# Patient Record
(demographics unavailable — no encounter records)

---

## 2025-01-08 NOTE — HISTORY OF PRESENT ILLNESS
[Public] : Public [ICT: _____] : Integrated Co-teaching class (Collaborative Team Teaching) [unfilled] [IEP] : Individualized Education Program [OHI] : Other Health Impairment [OT: ____] : Occupational Therapy [unfilled] [PT:____] : Physical Therapy [unfilled] [Aide: _____] : Aide or Paraprofessional [unfilled] [Counseling: _____] : Counseling [unfilled] [TA: Time: _____] : Extra time for tests [unfilled] [Spec. Transportation] : Special Transportation: Yes [Other: ____] : [unfilled] [Entering in September] : entering in September [Doing Well] : Doing well [TA: Other] : Other testing accommodations [No Side Effects] : no side effects [12 mos.] : 12 - Month Special Service and/or Program: No [FreeTextEntry4] : attends [employer] #13 [FreeTextEntry3] : dated 10/17/2024 (see scanned docs). Annual review scheduled for 10/10/2025. [FreeTextEntry2] : At time of IEP evaluation (beginning of 4th gr), DERRICK is functioning on grade level (3rd) for both Reading & Math.  [FreeTextEntry1] : 2024-25 School Year-- .CALVIN attends a full five day in-person learning schedule   [TWNoteComboBox1] : 4th Grade [de-identified] : During [employer] Classroom Observation (3rd period), DERRICK displayed difficulty with organization, remaining on task and frequently distracted. He required consistent support from the  to refocus and prompt and complete tasks.  [de-identified] : [employer] Psychoeducational Evaluation done in Sept 2024 with FSIQ = Uppermost End of Low Average; Visual Spatial & Working Memory = Average, Processing Speed & Fluid Reasoning = Borderline range.  [de-identified] : Methylphenidate HCL ER (LA) 40mg is effective  [de-identified] : Teachers report. CALVIN being a little more fidgety. Request to increase the Methylphenidate HCL 10mg booster dose at 12pm. Behavior-sanchez .CALVIN is an "donnell" compared to at home.   [de-identified] : Upon review of DERRICK's [employer] Psychoeducational Assessment dated 9/30/2024, he presents with Social-Emotional Challenges as a significant source of difficulty and distress for him.  He is openly sad about not having many friends wishing to be more "popular" among his peers. He continues to have 1:1 and Grp Counseling per his IEP which is beneficial per Grandmother & .CALVIN.  [de-identified] : .CALVIN gives Grandmother some difficulties with writing assignments otherwise  usually can complete with a little support and the Methylphenidate HCL 10mg booster PRN.   [de-identified] : Very difficult; one moment. CALVIN is very sweet and the next very disrespectful and irritable.  [de-identified] : No concerns per Grandmother however school IEP states .CALVIN feeing sad and wishing to have more friends and be "more popular".  [de-identified] : .CALVIN can suddenly become irritable without any trigger.   [de-identified] : .CALVIN loves LEGO and books.  [de-identified] : Grandmother states both ofAugustine SIMPSON's biological parents have a history of Bipolar Disease.  [de-identified] : .CALVIN's mood changes spontaneously where he is loving & sweet and the next moment says very hurtful things to Grandmother. Discussed a trial of Alpha-2 Agonists to help with his mood and impulsivity. Clonidine trial prior was ineffective for sleep. Will try Guanfacine. Grandmother agreed to trial.   [FreeTextEntry6] : Denies any recent illness, accidents, ER visits or hospitalizations since last visit.

## 2025-01-08 NOTE — REASON FOR VISIT
[Follow-Up Visit] : a follow-up visit for [ADHD] : ADHD [Anxiety] : anxiety [Autism Spectrum Disorder] : autism spectrum disorder [Behavior Problems] : behavior problems [Response to Medication] : response to medication [Progress with Services] : progress with services [Other: ____] : [unfilled] [Patient] : patient [Developmental testing] : developmental testing [IEP] : IEP [Foster Parents/Guardian] : /guardian [FreeTextEntry4] : July 2024-- Hydroxyzine (10mg) 1/2-tablet "Magic Pill" PRN for anxiety is effective. For sleep, the 1 tablet is not needed.  Methylphenidate HCL ER (LA or CD) 40mg.  Methylphenidate HCL 10mg BID PRN Aug 2023-- Clonidine (0.1mg) 1/2-tablet HS--- D/c'ed June 2024. Melatonin PRN given. [FreeTextEntry3] : July 16, 2024

## 2025-01-08 NOTE — PHYSICAL EXAM
X Size Of Lesion In Cm: 0.4 [External ears normal] : external ears normal [Normal] : patient has a normal gait [Attention Intact] : attention intact [Able to redirect] : able to redirect [Well-behaved during visit] : well-behaved during visit [Cooperative when examined] : cooperative when examined [Appropriate eye contact] : appropriate eye contact [Smiles responsively] : smiles responsively [Quiet/calm] : quiet/calm [Positive mood] : positive mood [Answered questions appropriately] : answered questions appropriately [Responds to name] : responds to name [Able to follow one step commands] : able to follow one step commands [Joint attention noted] : joint attention noted [Social referencing noted] : social referencing noted [Toe-Walking] : no toe-walking [Easily Distracted] : not easily distracted [Needs frequent redirecting] : does not need frequent redirecting [Difficulty shifting attention or transitioning] : no difficulty shifting attention or transitioning [Fidgets] : does not fidget [Moves quickly from one activity to another] : does not move quickly from one activity to another [Oppositional] : not oppositional [Withholding] : no withholding [Negative mood] : no negative mood [Hypersensitive] : not hypersensitive [Echolalia] : no echolalia [Difficult to engage in play] : not difficult to engage in play [de-identified] : -- .CALVIN presented to the visit in a pleasant and polite manner. He was engaged in the conversation and able to expand on any questioned asked with full detail and recall in complete sentences.   -- when discussing his irritable behaviors, .CALVIN verbalizes understanding that he should behave that way (tearful but holding it back).  He states sometimes he can't control something he says or sometimes is just in a "bad" mood.

## 2025-01-08 NOTE — PLAN
[Findings (To Date)] : Findings from evaluation (to date) [Clinical Basis] : Clinical basis for current diagnosis and clinical impressions [Co-Morbidities] : Clinical disorders and problem commonly associated with this child's condition (now or in the future) [Prognosis] : Prognosis [Goals / Benefits] : Goals & potential benefits of treatment with medication, as well as the limitations of pharmacotherapy [Stimulants] : Potential benefits and limitations of treatment with stimulant medication.  Potential adverse events were also reviewed, including insomnia, reduced appetite, change in blood pressure or heart rate, headache, stomachache, slowing of growth, moodiness, and onset of tics [Alpha-2s] : Potential benefits and limitations of treatment with alpha-2 agonists. Potential adverse events were also reviewed, including dry mouth, constipation, sedation, and change in blood pressure with potential for light-headedness when standing.  [Compliance] : Importance of medication compliance [AE Strategies] : Strategies to reduce side effects from current or proposed medication regimen [Counseling] : Benefits and limits of counseling or therapy [Behavior Modification] : Behavior modification strategies [Resources] : Other available resources [CSE / IEP] : Committee on Special Education (CSE) evaluations and Individualized Education Programs (IEP) [Family Questions] : Family's questions were addressed [Diet] : Evidence-based clinical information about diet [Sleep] : The importance of sleep and strategies to ensure adequate sleep [Media / Screen Time] : Importance of limiting electronics, media, and screen time [Exercise] : Regular exercise [Reading] : Importance of daily reading [Injury Prevention] : injury prevention [Rationale for Medication Discussed] : The rationale for treating inattention, distractibility, hyperactivity, or impulsivity with medication was discussed. The desired effects, possible side effects, and need for monitoring response were reviewed. Information about various medication options was provided.  The option of not treating with medication was also discussed. [Cardiac risk factors for treatment] : Cardiac risk factors for treatment of stimulant medications were reviewed, including history of prior seizure, unexplained loss of consciousness, congenital heart disease, arrhythmias, or family history of sudden unexplained cardiac death in family members below the age of 40. [Med Options Discussed: _____] : - Medication options discussed [unfilled] [Continue IEP] : - Continue services as presently provided for in the Individualized Education Program [More Classroom Support] : - In the classroom, [unfilled] will need more support, guidance, positive reinforcement and feedback than many of ~his/her~ classmates.  Accordingly, ~he/she~ will need to be in a classroom with a low student to teacher ratio.  Placement in an inclusion/collaborative teaching classroom would achieve this goal [ADHD EDU/Behav. Strategies (Gen)] : - Those educational and behavioral strategies known to be helpful to children with ADHD should be implemented in the classroom. [Instruction in Executive Function Skills] : - Direct, individualized instruction in executive function-related skills: i.e. task analysis, planning, organization, study strategies, memorization [Individual In-School Counseling] : - Individual counseling weekly with school psychologist or  [Social Skills] : - Social skills training [Behavior Management Training (parents)] : - Behavior management training / counseling for parents [Home Behavior Techniques] : - Specific behavioral techniques that can be implemented at home were discussed [Cessation of screen use before bedtime] : - Ensure cessation of video screen use one hour before bedtime [Limit Screen Time] : - Limit screen time [Rationale Discussed] : - The rationale for treating inattention, distractibility, hyperactivity, or impulsivity with medication was discussed. The desired effects, possible side effects, and need for monitoring response were reviewed. The various available medications were compared and contrasted, and the option of not treating with medication were also discussed [Cardiac risk factors for treatment] : - Cardiac risk factors for treatment of stimulant medications were reviewed, including history of prior seizure, unexplained loss of consciousness, congenital heart disease, arrhythmias, or family history of sudden unexplained cardiac death in family members below the age of 40 [Developmental Testiing] : Clinical implications of developmental testing [Other: _____] : [unfilled] [Dev. Therapies: ____] : Benefits and limits of developmental therapies: [unfilled] [FreeTextEntry1] :   ADHD-- continue with Methylphenidate HCL ER (LA) to 40mg and increase booster of Methylphenidate HCL to 20mg BID PRN Updated 504 done for school nurse.  Discussed an adjunct of Alpha-2 Agonists (Guanfacine) if his behaviors return in the Fall. Medication educational handout given for reference. Grandmother will reach out when she feels a trial is needed.  Updated Psychoeducational and 2024-25 IEP reviewed. Continue with therapies particularly 1:1 Counseling regarding .CALVIN's feelings of social difficulties with peers. Encouraged his preferred activities of LEGO and books as well as non-preferred activities to help with his peer relationships.  Follow-up on Adoption status.   ASD/ODB-- continue with consistent boundaries and limit setting.  Trial of Guanfacine (1mg) 1/2-tablet at HS x 7 days. On day #8, change administration to afternoon. Grandmother to call with an update of adjustment as needed. If Alpha-2 Agonists trial ineffective, discuss the possibility of an antipsychotic.   Recommended to reach out for [facility] as [facility] may now be covering the therapies when needed. .CALVIN would benefit from therapy to help with his dysregulation and Social Skills programs for his feelings of difficulties with peer relations, transitioning to do non-preferred activities to help his desire for increased friendships.   Sleep Difficulties/Bed Wetting-- Resolved. Continue with regular bedtime routine. .CALVIN now changes his own sheets and washes them when he refuses to get up at night to use the restroom. Will monitor.   Anxiety-- continue with school 1:1 counseling as per IEP. May add private as needed. Continue with Hydroxyzine (10mg) 1/2-tablet PRN which Grandmother states the "Magic Pill" is effective and .CALVIN's demeanor is better once taken. Will monitor. [facility] Parents & Child forms given. Will review at next visit.  Topics discussed with parent, refer to counseling section of note.  Grandmother is aware to call the office as needed should any concerns or questions arise otherwise return in 4-6 weeks.

## 2025-01-08 NOTE — HISTORY OF PRESENT ILLNESS
[Public] : Public [ICT: _____] : Integrated Co-teaching class (Collaborative Team Teaching) [unfilled] [IEP] : Individualized Education Program [OHI] : Other Health Impairment [OT: ____] : Occupational Therapy [unfilled] [PT:____] : Physical Therapy [unfilled] [Aide: _____] : Aide or Paraprofessional [unfilled] [Counseling: _____] : Counseling [unfilled] [TA: Time: _____] : Extra time for tests [unfilled] [Spec. Transportation] : Special Transportation: Yes [Other: ____] : [unfilled] [Entering in September] : entering in September [Doing Well] : Doing well [TA: Other] : Other testing accommodations [No Side Effects] : no side effects [12 mos.] : 12 - Month Special Service and/or Program: No [FreeTextEntry4] : attends [employer] #13 [FreeTextEntry3] : dated 10/17/2024 (see scanned docs). Annual review scheduled for 10/10/2025. [FreeTextEntry2] : At time of IEP evaluation (beginning of 4th gr), DERRICK is functioning on grade level (3rd) for both Reading & Math.  [FreeTextEntry1] : 2024-25 School Year-- .CALVIN attends a full five day in-person learning schedule   [TWNoteComboBox1] : 4th Grade [de-identified] : During [employer] Classroom Observation (3rd period), DERRICK displayed difficulty with organization, remaining on task and frequently distracted. He required consistent support from the  to refocus and prompt and complete tasks.  [de-identified] : [employer] Psychoeducational Evaluation done in Sept 2024 with FSIQ = Uppermost End of Low Average; Visual Spatial & Working Memory = Average, Processing Speed & Fluid Reasoning = Borderline range.  [de-identified] : Methylphenidate HCL ER (LA) 40mg is effective  [de-identified] : Teachers report. CALVIN being a little more fidgety. Request to increase the Methylphenidate HCL 10mg booster dose at 12pm. Behavior-sanchez .CALVIN is an "donnell" compared to at home.   [de-identified] : Upon review of DERRICK's [employer] Psychoeducational Assessment dated 9/30/2024, he presents with Social-Emotional Challenges as a significant source of difficulty and distress for him.  He is openly sad about not having many friends wishing to be more "popular" among his peers. He continues to have 1:1 and Grp Counseling per his IEP which is beneficial per Grandmother & .CALVIN.  [de-identified] : .CALVIN gives Grandmother some difficulties with writing assignments otherwise  usually can complete with a little support and the Methylphenidate HCL 10mg booster PRN.   [de-identified] : Very difficult; one moment. CALVIN is very sweet and the next very disrespectful and irritable.  [de-identified] : No concerns per Grandmother however school IEP states .CALVIN feeing sad and wishing to have more friends and be "more popular".  [de-identified] : .CALVIN can suddenly become irritable without any trigger.   [de-identified] : .CALVIN loves LEGO and books.  [de-identified] : Grandmother states both ofAugustine SIMPSON's biological parents have a history of Bipolar Disease.  [de-identified] : .CALVIN's mood changes spontaneously where he is loving & sweet and the next moment says very hurtful things to Grandmother. Discussed a trial of Alpha-2 Agonists to help with his mood and impulsivity. Clonidine trial prior was ineffective for sleep. Will try Guanfacine. Grandmother agreed to trial.   [FreeTextEntry6] : Denies any recent illness, accidents, ER visits or hospitalizations since last visit.

## 2025-01-08 NOTE — REVIEW OF SYSTEMS
[Moodiness] : moodiness [Irritability] : irritability [Normal] : Hematologic/Lymphatic [Difficulty Falling Asleep] : no difficulty falling asleep [Difficulty Remaining Asleep] : no difficulty remaining asleep

## 2025-01-08 NOTE — HISTORY OF PRESENT ILLNESS
[Public] : Public [ICT: _____] : Integrated Co-teaching class (Collaborative Team Teaching) [unfilled] [IEP] : Individualized Education Program [OHI] : Other Health Impairment [OT: ____] : Occupational Therapy [unfilled] [PT:____] : Physical Therapy [unfilled] [Aide: _____] : Aide or Paraprofessional [unfilled] [Counseling: _____] : Counseling [unfilled] [TA: Time: _____] : Extra time for tests [unfilled] [Spec. Transportation] : Special Transportation: Yes [Other: ____] : [unfilled] [Entering in September] : entering in September [Doing Well] : Doing well [TA: Other] : Other testing accommodations [No Side Effects] : no side effects [12 mos.] : 12 - Month Special Service and/or Program: No [FreeTextEntry4] : attends [employer] #13 [FreeTextEntry3] : dated 10/17/2024 (see scanned docs). Annual review scheduled for 10/10/2025. [FreeTextEntry2] : At time of IEP evaluation (beginning of 4th gr), DERRICK is functioning on grade level (3rd) for both Reading & Math.  [FreeTextEntry1] : 2024-25 School Year-- .CALVIN attends a full five day in-person learning schedule   [TWNoteComboBox1] : 4th Grade [de-identified] : During [employer] Classroom Observation (3rd period), DERRICK displayed difficulty with organization, remaining on task and frequently distracted. He required consistent support from the  to refocus and prompt and complete tasks.  [de-identified] : [employer] Psychoeducational Evaluation done in Sept 2024 with FSIQ = Uppermost End of Low Average; Visual Spatial & Working Memory = Average, Processing Speed & Fluid Reasoning = Borderline range.  [de-identified] : Methylphenidate HCL ER (LA) 40mg is effective  [de-identified] : Teachers report. CALVIN being a little more fidgety. Request to increase the Methylphenidate HCL 10mg booster dose at 12pm. Behavior-sanchez .CALVIN is an "donnell" compared to at home.   [de-identified] : Upon review of DERRICK's [employer] Psychoeducational Assessment dated 9/30/2024, he presents with Social-Emotional Challenges as a significant source of difficulty and distress for him.  He is openly sad about not having many friends wishing to be more "popular" among his peers. He continues to have 1:1 and Grp Counseling per his IEP which is beneficial per Grandmother & .CALVIN.  [de-identified] : .CALVIN gives Grandmother some difficulties with writing assignments otherwise  usually can complete with a little support and the Methylphenidate HCL 10mg booster PRN.   [de-identified] : Very difficult; one moment. CALVIN is very sweet and the next very disrespectful and irritable.  [de-identified] : No concerns per Grandmother however school IEP states .CALVIN feeing sad and wishing to have more friends and be "more popular".  [de-identified] : .CALVIN can suddenly become irritable without any trigger.   [de-identified] : .CALVIN loves LEGO and books.  [de-identified] : Grandmother states both ofAugustine SIMPSON's biological parents have a history of Bipolar Disease.  [de-identified] : .CALVIN's mood changes spontaneously where he is loving & sweet and the next moment says very hurtful things to Grandmother. Discussed a trial of Alpha-2 Agonists to help with his mood and impulsivity. Clonidine trial prior was ineffective for sleep. Will try Guanfacine. Grandmother agreed to trial.   [FreeTextEntry6] : Denies any recent illness, accidents, ER visits or hospitalizations since last visit.

## 2025-01-08 NOTE — PHYSICAL EXAM
[External ears normal] : external ears normal [Normal] : patient has a normal gait [Attention Intact] : attention intact [Able to redirect] : able to redirect [Well-behaved during visit] : well-behaved during visit [Cooperative when examined] : cooperative when examined [Appropriate eye contact] : appropriate eye contact [Smiles responsively] : smiles responsively [Quiet/calm] : quiet/calm [Positive mood] : positive mood [Answered questions appropriately] : answered questions appropriately [Responds to name] : responds to name [Able to follow one step commands] : able to follow one step commands [Joint attention noted] : joint attention noted [Social referencing noted] : social referencing noted [Toe-Walking] : no toe-walking [Easily Distracted] : not easily distracted [Needs frequent redirecting] : does not need frequent redirecting [Difficulty shifting attention or transitioning] : no difficulty shifting attention or transitioning [Fidgets] : does not fidget [Moves quickly from one activity to another] : does not move quickly from one activity to another [Oppositional] : not oppositional [Withholding] : no withholding [Negative mood] : no negative mood [Hypersensitive] : not hypersensitive [Echolalia] : no echolalia [Difficult to engage in play] : not difficult to engage in play [de-identified] : -- .CALVIN presented to the visit in a pleasant and polite manner. He was engaged in the conversation and able to expand on any questioned asked with full detail and recall in complete sentences.   -- when discussing his irritable behaviors, .CALVIN verbalizes understanding that he should behave that way (tearful but holding it back).  He states sometimes he can't control something he says or sometimes is just in a "bad" mood.

## 2025-01-08 NOTE — SOCIAL HISTORY
[FreeTextEntry6] : August 2023-- Court is postponed as they had trouble contacting .CALVIN's Mom to finalize the adoption. For now, .Grandmother was granted Guardianship until the next date.

## 2025-01-08 NOTE — PHYSICAL EXAM
[External ears normal] : external ears normal [Normal] : patient has a normal gait [Attention Intact] : attention intact [Able to redirect] : able to redirect [Well-behaved during visit] : well-behaved during visit [Cooperative when examined] : cooperative when examined [Appropriate eye contact] : appropriate eye contact [Smiles responsively] : smiles responsively [Quiet/calm] : quiet/calm [Positive mood] : positive mood [Answered questions appropriately] : answered questions appropriately [Responds to name] : responds to name [Able to follow one step commands] : able to follow one step commands [Joint attention noted] : joint attention noted [Social referencing noted] : social referencing noted [Toe-Walking] : no toe-walking [Easily Distracted] : not easily distracted [Needs frequent redirecting] : does not need frequent redirecting [Difficulty shifting attention or transitioning] : no difficulty shifting attention or transitioning [Fidgets] : does not fidget [Moves quickly from one activity to another] : does not move quickly from one activity to another [Oppositional] : not oppositional [Withholding] : no withholding [Negative mood] : no negative mood [Hypersensitive] : not hypersensitive [Echolalia] : no echolalia [Difficult to engage in play] : not difficult to engage in play [de-identified] : -- .CALVIN presented to the visit in a pleasant and polite manner. He was engaged in the conversation and able to expand on any questioned asked with full detail and recall in complete sentences.   -- when discussing his irritable behaviors, .CALVIN verbalizes understanding that he should behave that way (tearful but holding it back).  He states sometimes he can't control something he says or sometimes is just in a "bad" mood.

## 2025-02-19 NOTE — PLAN
[Med Options Discussed: _____] : - Medication options discussed [unfilled] [Continue IEP] : - Continue services as presently provided for in the Individualized Education Program [More Classroom Support] : - In the classroom, [unfilled] will need more support, guidance, positive reinforcement and feedback than many of ~his/her~ classmates.  Accordingly, ~he/she~ will need to be in a classroom with a low student to teacher ratio.  Placement in an inclusion/collaborative teaching classroom would achieve this goal [ADHD EDU/Behav. Strategies (Gen)] : - Those educational and behavioral strategies known to be helpful to children with ADHD should be implemented in the classroom. [Instruction in Executive Function Skills] : - Direct, individualized instruction in executive function-related skills: i.e. task analysis, planning, organization, study strategies, memorization [Individual In-School Counseling] : - Individual counseling weekly with school psychologist or  [Social Skills] : - Social skills training [Behavior Management Training (parents)] : - Behavior management training / counseling for parents [Home Behavior Techniques] : - Specific behavioral techniques that can be implemented at home were discussed [Cessation of screen use before bedtime] : - Ensure cessation of video screen use one hour before bedtime [Limit Screen Time] : - Limit screen time [Rationale Discussed] : - The rationale for treating inattention, distractibility, hyperactivity, or impulsivity with medication was discussed. The desired effects, possible side effects, and need for monitoring response were reviewed. The various available medications were compared and contrasted, and the option of not treating with medication were also discussed [Cardiac risk factors for treatment] : - Cardiac risk factors for treatment of stimulant medications were reviewed, including history of prior seizure, unexplained loss of consciousness, congenital heart disease, arrhythmias, or family history of sudden unexplained cardiac death in family members below the age of 40 [FreeTextEntry1] :   ADHD-- continue with Methylphenidate HCL ER (LA) to 40mg and increase booster of Methylphenidate HCL to 20mg BID PRN Updated 504 done for school nurse.  Discussed an adjunct of Alpha-2 Agonists (Guanfacine) if his behaviors return in the Fall. Medication educational handout given for reference. Grandmother will reach out when she feels a trial is needed.  Updated Psychoeducational and 2024-25 IEP reviewed. Continue with therapies particularly 1:1 Counseling regarding .CALVIN's feelings of social difficulties with peers. Encouraged his preferred activities of LEGO and books as well as non-preferred activities to help with his peer relationships.  Follow-up on Adoption status.   ASD/ODB-- continue with consistent boundaries and limit setting.  Trial of Guanfacine (1mg) 1/2-tablet at HS x 7 days. On day #8, change administration to afternoon. Grandmother to call with an update of adjustment as needed. If Alpha-2 Agonists trial ineffective, discuss the possibility of an antipsychotic.   Recommended to reach out for [facility] as [facility] may now be covering the therapies when needed. .CALVIN would benefit from therapy to help with his dysregulation and Social Skills programs for his feelings of difficulties with peer relations, transitioning to do non-preferred activities to help his desire for increased friendships.   Sleep Difficulties/Bed Wetting-- Resolved. Continue with regular bedtime routine. .CALVIN now changes his own sheets and washes them when he refuses to get up at night to use the restroom. Will monitor.   Anxiety-- continue with school 1:1 counseling as per IEP. May add private as needed. Continue with Hydroxyzine (10mg) 1/2-tablet PRN which Grandmother states the "Magic Pill" is effective and .CALVIN's demeanor is better once taken. Will monitor. [facility] Parents & Child forms given. Will review at next visit.  Topics discussed with parent, refer to counseling section of note.  Grandmother is aware to call the office as needed should any concerns or questions arise otherwise return in 4-6 weeks.     [Findings (To Date)] : Findings from evaluation (to date) [Clinical Basis] : Clinical basis for current diagnosis and clinical impressions [Co-Morbidities] : Clinical disorders and problem commonly associated with this child's condition (now or in the future) [Prognosis] : Prognosis [Developmental Testiing] : Clinical implications of developmental testing [Other: _____] : [unfilled] [Goals / Benefits] : Goals & potential benefits of treatment with medication, as well as the limitations of pharmacotherapy [Stimulants] : Potential benefits and limitations of treatment with stimulant medication.  Potential adverse events were also reviewed, including insomnia, reduced appetite, change in blood pressure or heart rate, headache, stomachache, slowing of growth, moodiness, and onset of tics [Alpha-2s] : Potential benefits and limitations of treatment with alpha-2 agonists. Potential adverse events were also reviewed, including dry mouth, constipation, sedation, and change in blood pressure with potential for light-headedness when standing.  [Compliance] : Importance of medication compliance [AE Strategies] : Strategies to reduce side effects from current or proposed medication regimen [Dev. Therapies: ____] : Benefits and limits of developmental therapies: [unfilled] [Counseling] : Benefits and limits of counseling or therapy [Behavior Modification] : Behavior modification strategies [Resources] : Other available resources [Family Questions] : Family's questions were addressed [Diet] : Evidence-based clinical information about diet [Sleep] : The importance of sleep and strategies to ensure adequate sleep [Media / Screen Time] : Importance of limiting electronics, media, and screen time [Exercise] : Regular exercise [Reading] : Importance of daily reading [Injury Prevention] : injury prevention [Rationale for Medication Discussed] : The rationale for treating inattention, distractibility, hyperactivity, or impulsivity with medication was discussed. The desired effects, possible side effects, and need for monitoring response were reviewed. Information about various medication options was provided.  The option of not treating with medication was also discussed. [Cardiac risk factors for treatment] : Cardiac risk factors for treatment of stimulant medications were reviewed, including history of prior seizure, unexplained loss of consciousness, congenital heart disease, arrhythmias, or family history of sudden unexplained cardiac death in family members below the age of 40.

## 2025-02-19 NOTE — HISTORY OF PRESENT ILLNESS
[Entering in September] : entering in September [Public] : Public [ICT: _____] : Integrated Co-teaching class (Collaborative Team Teaching) [unfilled] [IEP] : Individualized Education Program [Other: ____] : [unfilled] [OHI] : Other Health Impairment [OT: ____] : Occupational Therapy [unfilled] [Counseling: _____] : Counseling [unfilled] [TA: Other] : Other testing accommodations [12 mos.] : 12 - Month Special Service and/or Program: No [Spec. Transportation] : Special Transportation: Yes [FreeTextEntry4] : attends [employer]13 [FreeTextEntry3] : dated 10/17/2024 (see scanned docs). Annual review scheduled for 10/10/2025. [FreeTextEntry2] : At time of IEP evaluation (beginning of 4th gr), DERRICK is functioning on grade level (3rd) for both Reading & Math.  [TWNoteComboBox1] : 4th Grade [FreeTextEntry1] : .CALVIN and Grandmother present for follow-up. The trial of Guanfacine (1mg) 1/1-tablet showed some "minimal" improvement. .CALVIN did decrease his yelling however he still wakes up in "a mood". The pharmacy also incorrectly dispensed only 7 tablets in the latest refill therefore Grandmother has not been able to administer it consistently for fear of running out of the Guanfacine.     continue meds including guanfacine increased to 1mg in the afternoon to be given consistently as pharmacy only dispneded 7 talbetas.  rtc in 3-5 months guanfacine 1mg afternoon hydroyxyzine refill cd 40mg x 7 as only 23 [facility]

## 2025-02-19 NOTE — REASON FOR VISIT
[Family Member] : family member [Home] : at home, [unfilled] , at the time of the visit. [Medical Office: ([facility])___] : at the medical office located in  [Follow-Up Visit] : a follow-up visit for [ADHD] : ADHD [Anxiety] : anxiety [Autism Spectrum Disorder] : autism spectrum disorder [Behavior Problems] : behavior problems [Response to Medication] : response to medication [Progress with Services] : progress with services [Other: ____] : [unfilled] [Patient] : patient [Foster Parents/Guardian] : /guardian [FreeTextEntry4] : Jan 2025-- Guanfacine (1mg) 1/2-tablet at HS x 7 then day #8 changed to afternoon dosing and increased to 1-tablet  July 2024-- Hydroxyzine (10mg) 1/2-tablet "Magic Pill" PRN for anxiety is effective. For sleep, the 1 tablet is not needed.  Methylphenidate HCL ER (LA or CD) 40mg.  Methylphenidate HCL 20mg BID PRN Aug 2023-- Clonidine (0.1mg) 1/2-tablet HS--- D/c'ed June 2024. Melatonin PRN given. [FreeTextEntry3] : Jan 7, 2024

## 2025-07-03 NOTE — PLAN
[Findings (To Date)] : Findings from evaluation (to date) [Clinical Basis] : Clinical basis for current diagnosis and clinical impressions [Co-Morbidities] : Clinical disorders and problem commonly associated with this child's condition (now or in the future) [Prognosis] : Prognosis [Other: _____] : [unfilled] [Goals / Benefits] : Goals & potential benefits of treatment with medication, as well as the limitations of pharmacotherapy [Stimulants] : Potential benefits and limitations of treatment with stimulant medication.  Potential adverse events were also reviewed, including insomnia, reduced appetite, change in blood pressure or heart rate, headache, stomachache, slowing of growth, moodiness, and onset of tics [Alpha-2s] : Potential benefits and limitations of treatment with alpha-2 agonists. Potential adverse events were also reviewed, including dry mouth, constipation, sedation, and change in blood pressure with potential for light-headedness when standing.  [Compliance] : Importance of medication compliance [AE Strategies] : Strategies to reduce side effects from current or proposed medication regimen [Dev. Therapies: ____] : Benefits and limits of developmental therapies: [unfilled] [Counseling] : Benefits and limits of counseling or therapy [Behavior Modification] : Behavior modification strategies [Resources] : Other available resources [Family Questions] : Family's questions were addressed [Diet] : Evidence-based clinical information about diet [Sleep] : The importance of sleep and strategies to ensure adequate sleep [Media / Screen Time] : Importance of limiting electronics, media, and screen time [Exercise] : Regular exercise [Reading] : Importance of daily reading [Injury Prevention] : injury prevention [Rationale for Medication Discussed] : The rationale for treating inattention, distractibility, hyperactivity, or impulsivity with medication was discussed. The desired effects, possible side effects, and need for monitoring response were reviewed. Information about various medication options was provided.  The option of not treating with medication was also discussed. [Cardiac risk factors for treatment] : Cardiac risk factors for treatment of stimulant medications were reviewed, including history of prior seizure, unexplained loss of consciousness, congenital heart disease, arrhythmias, or family history of sudden unexplained cardiac death in family members below the age of 40. [FreeTextEntry1] :  ADHD-- continue with Methylphenidate HCL ER (LA) to 40mg and booster of Methylphenidate HCL to 20mg BID PRN.  ASD/ODB-- continue with consistent boundaries and limit setting and Guanfacine (1mg) 1-tablet after school. To target. CALVIN's irritability & mood, trial of Risperidone (0.5mg) 1-tablet at bedtime x 7 days. On day #8, increase by 2 tablets (1mg) changing administration to the morning. Grandmother to call in 2 wks or PRN with an update  MQ11 form provided as requested to initiate Home Care aid to help with daily skills and supposedly will be provided Home [facility] as [facility] may now be covering the therapies when needed. .CALVIN would benefit from therapy to help with his dysregulation and Social Skills programs for his feelings of difficulties with peer relations, transitioning to do non-preferred activities to help his desire for increased friendships. Encouraged his preferred activities of LEGO and books as well as non-preferred activities to help with his peer relationships. Request Grandmother to forward the ASD assessments.  Follow-up on Adoption status.   Sleep Difficulties/Bed Wetting-- Resolved. Continue with regular bedtime routine. .CALVIN now changes his own sheets and washes them when he refuses to get up at night to use the restroom. Will monitor.   Anxiety-- continue with school 1:1 counseling as per IEP. May add private as needed. Continue with Hydroxyzine (10mg) 1/2-tablet PRN which Grandmother states the "Magic Pill" is effective and .CALVIN's demeanor is better once taken.    Topics discussed with parent, refer to counseling section of note.  Grandmother is aware to call the office as needed should any concerns or questions arise otherwise return in 3-5 months.

## 2025-07-03 NOTE — REASON FOR VISIT
[Follow-Up Visit] : a follow-up visit for [ADHD] : ADHD [Anxiety] : anxiety [Autism Spectrum Disorder] : autism spectrum disorder [Behavior Problems] : behavior problems [Response to Medication] : response to medication [Progress with Services] : progress with services [Other: ____] : [unfilled] [Patient] : patient [Foster Parents/Guardian] : /guardian [FreeTextEntry4] : Jan 2025-- Guanfacine (1mg) 1 tablet in AM (calmer)  July 2024-- Hydroxyzine (10mg) 1/2-tablet "Magic Pill" PRN for anxiety is effective. For sleep, the 1 tablet is not needed.  Methylphenidate HCL ER (LA or CD) 40mg.  Methylphenidate HCL 20mg BID PRN Aug 2023-- Clonidine (0.1mg) 1/2-tablet HS--- D/c'ed June 2024. Melatonin PRN given. [FreeTextEntry3] : Feb. 18, 2025

## 2025-07-03 NOTE — SOCIAL HISTORY
[FreeTextEntry6] : July 2023-- Court is postponed. Biological Mother has released her parental rights and granted Ms. Sanchez custody of DERRICK (and maybe his sister). Ms. Sanchez has hadAugustine SIMPSON since he was an infant when her son was dating. CALVIN's mother. When the son &. CALVIN Mom broke up, Mother allowed Ms. Sanchez to be the . However, she has been difficult to locate by the courts to finalize the adoption. The latest detour is the courts say .CALVIN is getting close to the age (11yrs) where he can verbalize where he wants to live with so may postpone the adoption until then. Will monitor progress.

## 2025-07-03 NOTE — PHYSICAL EXAM
[External ears normal] : external ears normal [Normal] : patient has a normal gait [Attention Intact] : attention intact [Able to redirect] : able to redirect [Fidgets] : fidgets [Well-behaved during visit] : well-behaved during visit [Cooperative when examined] : cooperative when examined [Appropriate eye contact] : appropriate eye contact [Smiles responsively] : smiles responsively [Quiet/calm] : quiet/calm [Positive mood] : positive mood [Answered questions appropriately] : answered questions appropriately [Responds to name] : responds to name [Able to follow one step commands] : able to follow one step commands [Joint attention noted] : joint attention noted [Social referencing noted] : social referencing noted [Easily Distracted] : not easily distracted [Needs frequent redirecting] : does not need frequent redirecting [Difficulty shifting attention or transitioning] : no difficulty shifting attention or transitioning [Moves quickly from one activity to another] : does not move quickly from one activity to another [Oppositional] : not oppositional [Withholding] : no withholding [Negative mood] : no negative mood [Hypersensitive] : not hypersensitive [Echolalia] : no echolalia

## 2025-07-03 NOTE — HISTORY OF PRESENT ILLNESS
[Entering in September] : entering in September [Public] : Public [ICT: _____] : Integrated Co-teaching class (Collaborative Team Teaching) [unfilled] [IEP] : Individualized Education Program [Other: ____] : [unfilled] [OHI] : Other Health Impairment [OT: ____] : Occupational Therapy [unfilled] [Counseling: _____] : Counseling [unfilled] [TA: Other] : Other testing accommodations [Spec. Transportation] : Special Transportation: Yes [Doing Well] : Doing well [No Side Effects] : no side effects [12 mos.] : 12 - Month Special Service and/or Program: No [FreeTextEntry4] : attends [employer]13 [FreeTextEntry3] : dated 10/17/2024 with annual review scheduled for 10/10/2025. [FreeTextEntry2] : At time of IEP evaluation (beginning of 4th gr), DERRICK is functioning on grade level (3rd) for both Reading & Math.  [FreeTextEntry1] : 2025-26 School Year-- .CALVIN attends a full five day in-person learning schedule   [TWNoteComboBox1] : 5th Grade [de-identified] : .CALVIN continues to do well academically and behaviorally. There are no behavior concerns at school. He is #3 on the Wait List for [facility] which goes up to HS. Grandmother is hoping .CALVIN can start this school year for 5th grade as she has been applying since last year.  Until then, .CALVIN will continue at [employer].  [de-identified] : "Excellent" per Grandmother [de-identified] : The Methylphenidate HCL ER (CD) 40mg daily (.CALVIN states he feels better when he takes his med vs when not on the stimulant). He also gets the booster doses of Methylphenidate HCL 20mg at 1245p at school to last til 215pm dismissal. Then Grandmother given him another booster at 3pm. No AE noted. [de-identified] : No behavioral issues at home.  [de-identified] : .CALVIN has poor ADL skills. Therefore, Grandmother did multiple intakes with RN, Psychologist, etc and discovered .CALVIN qualifies for home health aide and [facility] per Grandmother therefore requests an MQ11 Form. .CALVIN has a good appetite and no concerns with sleep. .CALVIN sometimes will wet the bed but more oppositional. Grandmother then has .CALVIN changes his sheets and put them in the wash vs her.  [de-identified] : He is given a booster and able to complete homework efficiently.  [de-identified] : Very disrespectful, oppositional and verbally threatens Grandmother. .CALVIN behaviors are only projected to Grandmother and no one else. .CALVIN does have daily chores (bed, garbage, hangs his clothes after using the pool, etc) and a daily reward chart when all chores are completed.  Unfortunately, his behaviors have resulted in no electronics presently.  [de-identified] : .CALVIN states he is always "angry". Grandmother has not used the Hydroxyzine (10mg) 1/2-tablet PRN "Magic Pill" much since last visit. She only gives it to DERRICK when his anxiety gets really gets "out of control".  [de-identified] : .CALVIN was not accepted for a 12-month school year. Grandmother has activities for him and sister as well as the other grandchildren all summer which includes their pool.  [de-identified] : Grandmother will have hip surgery in Sept 2025 and request follow-up visit be telehealth. She will return with DERRICK in August in-person.  [de-identified] : Discussed a trial of an antipsychotic to help with .CALVIN's irritability as the Guanfacine 1mg daily dose does not seem to be enough. Reviewed medication purpose, administration and AE. Grandmother verbalized understanding and agreed to a trial particularly since .CALVIN's biological parents are both Bipolar.  [FreeTextEntry6] : Denies any recent illness, accidents, ER visits or hospitalizations since last visit.

## 2025-07-03 NOTE — HISTORY OF PRESENT ILLNESS
[Entering in September] : entering in September [Public] : Public [ICT: _____] : Integrated Co-teaching class (Collaborative Team Teaching) [unfilled] [IEP] : Individualized Education Program [Other: ____] : [unfilled] [OHI] : Other Health Impairment [OT: ____] : Occupational Therapy [unfilled] [Counseling: _____] : Counseling [unfilled] [TA: Other] : Other testing accommodations [Spec. Transportation] : Special Transportation: Yes [Doing Well] : Doing well [No Side Effects] : no side effects [12 mos.] : 12 - Month Special Service and/or Program: No [FreeTextEntry4] : attends [employer] [FreeTextEntry3] : dated 10/17/2024 with annual review scheduled for 10/10/2025. [FreeTextEntry2] : At time of IEP evaluation (beginning of 4th gr), DERRICK is functioning on grade level (3rd) for both Reading & Math.  [FreeTextEntry1] : 2025-26 School Year-- .CALVIN attends a full five day in-person learning schedule   [TWNoteComboBox1] : 5th Grade [de-identified] : .CALVIN continues to do well academically and behaviorally. There are no behavior concerns at school. He is #3 on the Wait List for [facility] [employer] [facility] which goes up to HS. Grandmother is hoping .CALVIN can start this school year for 5th grade as she has been applying since last year.  Until then, .CALVIN will continue at [employer].  [de-identified] : "Excellent" per Grandmother [de-identified] : The Methylphenidate HCL ER (CD) 40mg daily (.CALVIN states he feels better when he takes his med vs when not on the stimulant). He also gets the booster doses of Methylphenidate HCL 20mg at 1245p at school to last til 215pm dismissal. Then Grandmother given him another booster at 3pm. No AE noted. [de-identified] : No behavioral issues at home.  [de-identified] : .CALVIN has poor ADL skills. Therefore, Grandmother did multiple intakes with RN, Psychologist, etc and discovered .CALVIN qualifies for home health aide and [facility] per Grandmother therefore requests an MQ11 Form. .CALVIN has a good appetite and no concerns with sleep. .CALVIN sometimes will wet the bed but more oppositional. Grandmother then has .CALVIN changes his sheets and put them in the wash vs her.  [de-identified] : He is given a booster and able to complete homework efficiently.  [de-identified] : Very disrespectful, oppositional and verbally threatens Grandmother. .CALVIN behaviors are only projected to Grandmother and no one else. .CALVIN does have daily chores (bed, garbage, hangs his clothes after using the pool, etc) and a daily reward chart when all chores are completed.  Unfortunately, his behaviors have resulted in no electronics presently.  [de-identified] : .CALVIN states he is always "angry". Grandmother has not used the Hydroxyzine (10mg) 1/2-tablet PRN "Magic Pill" much since last visit. She only gives it to DERRICK when his anxiety gets really gets "out of control".  [de-identified] : .CALVIN was not accepted for a 12-month school year. Grandmother has activities for him and sister as well as the other grandchildren all summer which includes their pool.  [de-identified] : Grandmother will have hip surgery in Sept 2025 and request follow-up visit be telehealth. She will return with DERRICK in August in-person.  [de-identified] : Discussed a trial of an antipsychotic to help with .CALVIN's irritability as the Guanfacine 1mg daily dose does not seem to be enough. Reviewed medication purpose, administration and AE. Grandmother verbalized understanding and agreed to a trial particularly since .CALVIN's biological parents are both Bipolar.  [FreeTextEntry6] : Denies any recent illness, accidents, ER visits or hospitalizations since last visit.

## 2025-07-03 NOTE — REVIEW OF SYSTEMS
[Wgt Gain] : recent weight gain [Difficulty Remaining Asleep] : difficulty remaining asleep [Nighttime Enuresis] : nighttime enuresis [Irritability] : irritability [Normal] : Hematologic/Lymphatic [Difficulty Falling Asleep] : no difficulty falling asleep